# Patient Record
(demographics unavailable — no encounter records)

---

## 2025-01-28 NOTE — REVIEW OF SYSTEMS
[GERD] : gerd [Trauma/ Injury] : trauma/ injury [Fever] : no fever [Fatigue] : no fatigue [Chills] : no chills [Epistaxis] : no epistaxis [Sore Throat] : no sore throat [Nasal Congestion] : nasal congestion [Postnasal Drip] : postnasal drip [Cough] : cough [Hemoptysis] : no hemoptysis [Chest Tightness] : no chest tightness [Sputum] : no sputum [Dyspnea] : no dyspnea [Wheezing] : no wheezing [SOB on Exertion] : no sob on exertion [Edema] : no edema [Leg Cramps] : no leg cramps [Hay Fever] : no hay fever [Abdominal Pain] : no abdominal pain [Nausea] : no nausea [Dysuria] : no dysuria [Arthralgias] : no arthralgias [Myalgias] : no myalgias [Rash] : no rash [Headache] : no headache [Focal Weakness] : no focal weakness [Seizures] : no seizures [Head Injury] : no head injury [Depression] : no depression [Anxiety] : no anxiety [Diabetes] : no diabetes [Thyroid Problem] : no thyroid problem

## 2025-01-28 NOTE — ASSESSMENT
[FreeTextEntry1] : 78F former smoker with a history of lung adenocarcinoma (s/p LLL lobectomy 3/12/21 with Dr. Ll[place] [facility] [place]), HTN, HLD, and hiatal hernia presenting for follow-up pulmonary evaluation.  #Cough and Rhinitis - Recent URI/Bronchitis - Short course of Prednisone 20mg PO daily - Completed course of antibiotics - Start trial of Flonase BID - Delsym PRN  #Pulmonary Nodules - I have asked patient to forward me a copy of her CT scan report from [facility] [place] - office email provided to patient - Prior LLL lobectomy - She has told me she is planned for a follow-up CT in April - I have asked patient to try and have CT images forwarded to me for review   #Lung Cancer - synchronous primaries - s/p L lobectomy 3/12/21 with Dr. Llanes at [facility] [place]  - No chemotherapy or radiation - stage I disease - Former distant smoker - no longer smoking  #Pulmonary Function Testing - Prior PFTs without obstruction or significant bronchodilator response - No wheezing on exam today - Repeat PFTs later this year - No role for bronchodilator therapy at this time - she was given Albuterol for her cough but did not notice any significant relief  #Hiatal hernia - continue dietary modifications - no longer having vomiting symptoms - Continue H2 blocker and/or PPI - Patient is hoping to avoid surgical intervention and will follow-up with Dr. Llanes  #Health Maintenance Spirometry: Reviewed Smoking status: Former smoker - now quit  Pneumococcal vaccination status: Given in 2021 per patient  [place] Sleepiness Scale: 2 (4/27/23)  #Follow-up in 2 months or sooner as needed  - Patient will call with any questions or concerns - All questions answered - Today's medical care services serve as the continuing focal point for needed health care services that are part of ongoing care related to a patient's one or more serious conditions or a complex condition.   The above plan was discussed with YUDI PINEDA  in detail. Patient verbalized understanding and agrees with plan as detailed above. Patient was provided education and counselling on current diagnosis/symptoms, diagnostic work up, treatment options and potential side effects of any prescribed therapy/therapies. YUDI  was advised to call our clinic at [phone] for any new or worsening symptoms, or with any questions or concerns. In case of acute onset of respiratory symptoms or worsening presentation, patient was advised to present to nearest emergency room for further evaluation. YUDI  expressed understanding and all questions/concerns were addressed.

## 2025-01-28 NOTE — HISTORY OF PRESENT ILLNESS
[Former] : former [>= 20 pack years] : >= 20 pack years [Never] : never [Feelings Of Weakness On Exertion] : exercise intolerance [Difficulty Breathing During Exertion] : dyspnea on exertion [Wheezing] : wheezing [Nasal Passage Blockage (Stuffiness)] : edema [0  -  Nothing at all] : 0, nothing at all [Nonspecific Pain, Swelling, And Stiffness] : chest pain [Cough] : coughing [TextBox_4] : 79F former smoker with a history of lung adenocarcinoma (s/p LLL lobectomy 3/12/21 with Dr. Llanes at [facility] [place]), hiatal hernia with GERD, HTN, and HLD presenting for follow-up pulmonary evaluation.  --- She was initially seen 4/27/23 in the setting of prior episodes of bronchitis requiring steroids and antibiotics.  - She had a complex end of the year with multiple hospitaliztions in [place] for sepsis and kidney stones requiring antibiotics, ureteral stent, and subsequent lithotripsy - She developed a cough about 2 weeks ago - has had urgent care visits with COVID testing and strep testing which has been negative - She was given Azithromycin and Benzonatate with some improvement. Had a CXR at [facility] which was reportedly clear (results not available) - She denies sputum production or hemoptysis - She had a CT in September at [facility] [place] but I have not received these results and she does not have them available on her phone/portal today - She is noting a runny nose with clear-yellow mucus - Her granddaughter's wedding went well in September - Her shoulder is healing - ended up needing PT but no  surgery - She does not use any nasal sprays - In 2024 had suspicious mammo which was Bi-RADS 4B this prompted biopsy x 2 - thus far negative - GERD symptoms are stable with medications - has deferred hiatal hernia surgery - She is not on any standing pulmonary medications   PFTs: 6/4/24 - FEV1 1.47L (85%), FVC 1.90L (81%), FEV1/FVC 77%, FEF 25-75 98%, TLC 90%, RV/TLC 50%, DLCO 63% PFTs: 6/12/23 - FEV1 1.64L (83%), FVC 2.21L (83%), FEV1/FVC 74%, FEF 25-75 77%, TLC 75%, RV/TLC 40%, DLCO 64%  FeNO: 6/12/23 - 24 ppb - low  From initial visit: She denies any reported history of asthma or COPD however over the last few years has had a few episodes of URI symptoms that have progressed to pneumonia with significant wheezing, coughing, and shortness of breath. She has been going to urgent care for evaluation and treatment and recently went for follow-up with Dr. Llanes and was recommended for pulmonary evaluation.  She is retired and previously worked in accounting and with clerical work. She did not have any occupational exposures. She remains active at her senior center with activities and exercises. She is engaged in her community. She lives alone but has a good support system. She has grandchildren who are older. She denies any sick contacts or exposures.  Presently she is feeling improved and reports that her pulmonary symptoms have resolved. She is not noted to wheeze on exam today but feels that she intermittently catches herself wheezing/making sounds with breathing. She denies chest pain or palpitations. She denies cough, sputum production, or wheezing.  She has also been noted to have a sizable hiatal hernia and has been recommended for consideration of surgical intervention with Dr. Llanes. She previously had significant vomiting/regurgitation symptoms but these have improved with dietary modifications. She does take a PPI, eats small meals, and avoids late night eating [TextBox_11] : 1 [YearQuit] : 1988 [TextBox_12] : 3/29/23 - [facility] [place] (REPORT ONLY) - LUNGS/AIRWAYS: Left lower lobectomy. Unchanged multiple subcentimeter pulmonary nodules, for example: RUL subpleural 0.4cm. L apex 0.3cm. New clusters of tree-in-bud nodular opacities in the RUL. Unchanged subsegmental atelectasis/scarring in the RML....Unchanged lower pretracheal lymph node 1.3 x 0.9 cm. IMPRESSION: 1. Since 9/28/22 unchanged bilateral subcentimeter pulmonary nodules bilaterally. 2. New tree-in-bud nodular opacities in the right lung probably infectious/inflammatory [TextBox_27] [place] [facility] [place] LUNGS/AIRWAYS: Status post left lower lobectomy with postsurgical changes. Mild peribronchial thickening in the right lung with a few new cluster of small nodules in the RLL and decrease in a small cluster of small peribronchial tree-in-bud nodules in the RUL. Unchanged subsegmental atelectasis in the RML. Moderate hiatal hernia. IMPRESSION: Since March 29, 2023 waxing and waning probably (75% certainty) infectious/inflammatory process in the right lung, follow-up recommended [TextBox_42] : 3/20/24 [place] [facility] [place] - LUNGS/AIRWAYS: Left lower lobectomy with postsurgical changes. No substantial change in bilateral punctate and subcentimeter discrete pulmonary nodules in long term follow-up including 9/29/21, possibly post inflammatory, infectious. Increased left lower lung nodule, measuring 0.4 cm, previously punctate, possibly  inflammatory, infectious. Increased bilateral peribronchial clustered nodules with exception of a few areas which are decreased, consistent with infectious, inflammatory process. * Left upper lung (3), new and increased. * Left lung base (2) increased. *R middlelobe and lower lobe near the fissure and lung base (3) new and increased. *Left lower lung (2) decreased.  IMPRESSION: 1. Since June 26, 2023, increased left lower lung nodule, possibly inflmmatory, infectious. Follow-up CT suggested. 2. Increased bilateral infectious, inflammatory peribronchial nodules with exception of a few decreased areas. 3. Unchanged bilateral discrete pulmonary nodules, possibly post inflammatory, infectious. 4. large right renal cyst compromisiing on the rennal hilum/vasculature [TextBox_57] : 9/ CT - [place] (REPORT)

## 2025-01-28 NOTE — END OF VISIT
[FreeTextEntry3] :  My cumulative time spent on today's visit included: Preparation for the visit, review of the medical records, review of pertinent diagnostic studies, review and integration of laboratory data, coordination of care, examination and counseling of the patient on the above diagnosis, treatment plan and prognosis, orders of diagnostic tests and any additional lab data ordered today, medications and/or appropriate procedures and documentation in the medical records of today's visit.  [Time Spent: ___ minutes] : I have spent [unfilled] minutes of time on the encounter which excludes teaching and separately reported services.

## 2025-01-28 NOTE — PHYSICAL EXAM
[No Acute Distress] : no acute distress [Well Nourished] : well nourished [Well Groomed] : well groomed [No Deformities] : no deformities [Well Developed] : well developed [Normal Oropharynx] : normal oropharynx [Normal Appearance] : normal appearance [Supple] : supple [No Neck Mass] : no neck mass [Normal Rate/Rhythm] : normal rate/rhythm [Normal S1, S2] : normal s1, s2 [No Resp Distress] : no resp distress [No Acc Muscle Use] : no acc muscle use [Normal Rhythm and Effort] : normal rhythm and effort [Clear to Auscultation Bilaterally] : clear to auscultation bilaterally [Soft] : soft [Normal Gait] : normal gait [No Clubbing] : no clubbing [No Cyanosis] : no cyanosis [No Edema] : no edema [Normal Color/ Pigmentation] : normal color/ pigmentation [No Focal Deficits] : no focal deficits [No Motor Deficits] : no motor deficits [Oriented x3] : oriented x3 [Normal Mood] : normal mood [Normal Affect] : normal affect [Not Tender] : not tender [TextBox_11] : NCAT, EOMI, anicteric, MMM, nares clear, no thrush [TextBox_68] : no wheeze or rhonchi, good air entry